# Patient Record
Sex: MALE | Race: WHITE | HISPANIC OR LATINO | Employment: UNEMPLOYED | URBAN - METROPOLITAN AREA
[De-identification: names, ages, dates, MRNs, and addresses within clinical notes are randomized per-mention and may not be internally consistent; named-entity substitution may affect disease eponyms.]

---

## 2017-09-04 ENCOUNTER — HOSPITAL ENCOUNTER (EMERGENCY)
Facility: HOSPITAL | Age: 47
Discharge: HOME | End: 2017-09-04
Attending: EMERGENCY MEDICINE

## 2017-09-04 VITALS
WEIGHT: 109 LBS | SYSTOLIC BLOOD PRESSURE: 122 MMHG | BODY MASS INDEX: 18.61 KG/M2 | RESPIRATION RATE: 20 BRPM | TEMPERATURE: 97 F | DIASTOLIC BLOOD PRESSURE: 86 MMHG | OXYGEN SATURATION: 99 % | HEIGHT: 64 IN | HEART RATE: 67 BPM

## 2017-09-04 DIAGNOSIS — R07.9 CHEST PAIN: ICD-10-CM

## 2017-09-04 DIAGNOSIS — R07.89 ANTERIOR CHEST WALL PAIN: ICD-10-CM

## 2017-09-04 DIAGNOSIS — S46.812A TRAPEZIUS STRAIN, LEFT, INITIAL ENCOUNTER: Primary | ICD-10-CM

## 2017-09-04 LAB
ALBUMIN SERPL BCP-MCNC: 4.1 G/DL
ALP SERPL-CCNC: 72 U/L
ALT SERPL W/O P-5'-P-CCNC: 17 U/L
ANION GAP SERPL CALC-SCNC: 9 MMOL/L
AST SERPL-CCNC: 18 U/L
BASOPHILS # BLD AUTO: 0.03 K/UL
BASOPHILS NFR BLD: 0.5 %
BILIRUB SERPL-MCNC: 0.7 MG/DL
BNP SERPL-MCNC: 26 PG/ML
BUN SERPL-MCNC: 12 MG/DL
CALCIUM SERPL-MCNC: 9.8 MG/DL
CHLORIDE SERPL-SCNC: 104 MMOL/L
CO2 SERPL-SCNC: 29 MMOL/L
CREAT SERPL-MCNC: 1.1 MG/DL
DIFFERENTIAL METHOD: ABNORMAL
EOSINOPHIL # BLD AUTO: 0.1 K/UL
EOSINOPHIL NFR BLD: 2.1 %
ERYTHROCYTE [DISTWIDTH] IN BLOOD BY AUTOMATED COUNT: 12.6 %
EST. GFR  (AFRICAN AMERICAN): >60 ML/MIN/1.73 M^2
EST. GFR  (NON AFRICAN AMERICAN): >60 ML/MIN/1.73 M^2
GLUCOSE SERPL-MCNC: 123 MG/DL
HCT VFR BLD AUTO: 47.8 %
HGB BLD-MCNC: 16 G/DL
LYMPHOCYTES # BLD AUTO: 0.9 K/UL
LYMPHOCYTES NFR BLD: 15.3 %
MCH RBC QN AUTO: 30.4 PG
MCHC RBC AUTO-ENTMCNC: 33.5 G/DL
MCV RBC AUTO: 91 FL
MONOCYTES # BLD AUTO: 0.4 K/UL
MONOCYTES NFR BLD: 7.6 %
NEUTROPHILS # BLD AUTO: 4.3 K/UL
NEUTROPHILS NFR BLD: 74.5 %
PLATELET # BLD AUTO: 246 K/UL
PMV BLD AUTO: 10.5 FL
POTASSIUM SERPL-SCNC: 4.4 MMOL/L
PROT SERPL-MCNC: 7.3 G/DL
RBC # BLD AUTO: 5.26 M/UL
SODIUM SERPL-SCNC: 142 MMOL/L
TROPONIN I SERPL DL<=0.01 NG/ML-MCNC: 0.01 NG/ML
WBC # BLD AUTO: 5.82 K/UL

## 2017-09-04 PROCEDURE — 80053 COMPREHEN METABOLIC PANEL: CPT

## 2017-09-04 PROCEDURE — 63600175 PHARM REV CODE 636 W HCPCS: Performed by: PHYSICIAN ASSISTANT

## 2017-09-04 PROCEDURE — 99284 EMERGENCY DEPT VISIT MOD MDM: CPT | Mod: 25

## 2017-09-04 PROCEDURE — 93005 ELECTROCARDIOGRAM TRACING: CPT

## 2017-09-04 PROCEDURE — 84484 ASSAY OF TROPONIN QUANT: CPT

## 2017-09-04 PROCEDURE — 83880 ASSAY OF NATRIURETIC PEPTIDE: CPT

## 2017-09-04 PROCEDURE — 93010 ELECTROCARDIOGRAM REPORT: CPT | Mod: ,,, | Performed by: INTERNAL MEDICINE

## 2017-09-04 PROCEDURE — 85025 COMPLETE CBC W/AUTO DIFF WBC: CPT

## 2017-09-04 PROCEDURE — 96372 THER/PROPH/DIAG INJ SC/IM: CPT

## 2017-09-04 RX ORDER — CYCLOBENZAPRINE HCL 10 MG
10 TABLET ORAL 3 TIMES DAILY PRN
Qty: 30 TABLET | Refills: 0 | Status: SHIPPED | OUTPATIENT
Start: 2017-09-04 | End: 2017-09-09

## 2017-09-04 RX ORDER — IBUPROFEN 200 MG
200 TABLET ORAL EVERY 6 HOURS PRN
COMMUNITY

## 2017-09-04 RX ORDER — KETOROLAC TROMETHAMINE 30 MG/ML
10 INJECTION, SOLUTION INTRAMUSCULAR; INTRAVENOUS
Status: COMPLETED | OUTPATIENT
Start: 2017-09-04 | End: 2017-09-04

## 2017-09-04 RX ADMIN — KETOROLAC TROMETHAMINE 10 MG: 30 INJECTION, SOLUTION INTRAMUSCULAR at 11:09

## 2017-09-04 NOTE — DISCHARGE INSTRUCTIONS
Establish care with primary care physician.  Take medications as prescribed.  Return to this ED if any problems occur.

## 2017-09-04 NOTE — ED PROVIDER NOTES
"Encounter Date: 9/4/2017    SCRIBE #1 NOTE: I, Karlie George, am scribing for, and in the presence of,  Justin Fields PA-C. I have scribed the following portions of the note - Other sections scribed: HPI/ROS.       History     Chief Complaint   Patient presents with    Headache     "My blood pressure is high and I feel tired" x couple of days. Also c/o lt upper back and shoulder pain with movement. Sts hurt shoulder years ago.    Hypertension     Also c/o lt chest hurting at times.     CC: Myalgia    HPI: Carissa 46 y.o. Male with no pertinent medical history presents to the ED c/o acute, mild myalgia (pain to the left trapezius muscle). Patient notes pain becomes worse with palpation. 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 Patient is also c/o acute numbness to digits of left hand, mild left-sided chest pain, and intermittent frontal headache. He attempted tx with Motrin for headache. Patient reports elevated blood pressure recently. He notes taking blood pressure which read "147/100" while at his sister's house. No exacerbating factors reported. No tx for other symptoms listed. Patient denies fever, chills, back pain, SOB, difficulty urinating, abdominal pain, N/V/D, hx of HTN, and recently taking any blood pressure medications. He does not have a PCP.       The history is provided by the patient. No  was used.     Review of patient's allergies indicates:  No Known Allergies  History reviewed. No pertinent past medical history.  Past Surgical History:   Procedure Laterality Date    APPENDECTOMY       History reviewed. No pertinent family history.  Social History   Substance Use Topics    Smoking status: Never Smoker    Smokeless tobacco: Never Used    Alcohol use Yes     Review of Systems   Constitutional: Negative for chills and fever.   HENT: Negative for congestion, ear pain, rhinorrhea and sore throat.    Eyes: Negative for pain and " visual disturbance.   Respiratory: Negative for cough and shortness of breath.    Cardiovascular: Positive for chest pain.   Gastrointestinal: Negative for abdominal pain, diarrhea, nausea and vomiting.   Genitourinary: Negative for dysuria.   Musculoskeletal: Positive for myalgias. Negative for back pain and neck pain.   Skin: Negative for rash.   Neurological: Positive for numbness and headaches.       Physical Exam     Initial Vitals [09/04/17 1058]   BP Pulse Resp Temp SpO2   (!) 142/86 97 20 98 °F (36.7 °C) 97 %      MAP       104.67         Physical Exam    Nursing note and vitals reviewed.  Constitutional: He appears well-developed and well-nourished. He is not diaphoretic. No distress.   HENT:   Head: Normocephalic and atraumatic.   Eyes: Conjunctivae and EOM are normal. Pupils are equal, round, and reactive to light.   Neck: Normal range of motion. Neck supple.       Cardiovascular: Normal heart sounds and intact distal pulses.   No murmur heard.  Pulmonary/Chest: Breath sounds normal. No respiratory distress. He has no wheezes. He has no rhonchi. He exhibits no tenderness.       Abdominal: Soft. Bowel sounds are normal. He exhibits no distension and no mass. There is no tenderness. There is no rebound and no guarding.   Musculoskeletal: Normal range of motion.   Neurological: He is alert and oriented to person, place, and time. He has normal strength.   Skin: Skin is warm and dry. Capillary refill takes less than 2 seconds. No rash and no abscess noted. No erythema.   Psychiatric: He has a normal mood and affect. His behavior is normal. Judgment and thought content normal.         ED Course   Procedures  Labs Reviewed   CBC W/ AUTO DIFFERENTIAL - Abnormal; Notable for the following:        Result Value    Lymph # 0.9 (*)     Gran% 74.5 (*)     Lymph% 15.3 (*)     All other components within normal limits   COMPREHENSIVE METABOLIC PANEL - Abnormal; Notable for the following:     Glucose 123 (*)     All  other components within normal limits   TROPONIN I   B-TYPE NATRIURETIC PEPTIDE             Medical Decision Making:   Initial Assessment:   46-year-old male chief complaint high blood pressure over the past 2 days in addition to left shoulder pain and left chest pain.  He admits to intermittent headache which has spontaneously resolved.  Differential Diagnosis:   Migraine, headache, dehydration, tension headache, headache disorder unspecified, MI, ischemia, muscle strain, neck strain, muscle spasm  ED Management:  Patient overall well-appearing, in no acute distress, afebrile, vitals within normal limits.    Patient admits to somewhat strenuous activities at work intermittently.  He states his left trapezius and left anterior chest have been bothering him for some time, however worsened over the past few days.  He states he has been checking his pressure with his sister blood pressure monitor, and states it is been elevated, somewhere near 140/100. He denies lightheadedness/dizziness, n/v, or visual disturbance, however does admit to intermittent headache.  He states headache has resolved at this time.  He denies history of cerebral aneurysms.  He denies history of TIA/CVA.  He admits to left arm pain without left arm weakness.  He is neurologically intact.  He denies slurred speech or amnesia.  Face is symmetric, tongue is midline.  Shoulder shrug against resistance is normal.  I do not suspect acute intracranial process at this time.  He denies trauma.  I do not feel need for imaging at this time.  On exam, patient does exhibit mild tenderness to palpation of left trapezius in addition to left anterior chest.  He states this pain does come and go.  He denies chest pain or shortness of breath.  There was some confusion during the exam as to whether chest pain was reproducible, I suspect secondary to leg which barrier.  I did order cardiac enzymes to rule out cardiogenic process.  EKG in sinus rhythm with short [place]  interval.  Ventricular rate 70 bpm without evidence of ischemia, arrhythmia, or heart block.  Chest x-ray without evidence of consolidation, effusion, or pneumothorax.  BNP negative.  Troponin negative.  CBC without evidence of infection or anemia.  CMP without acute abnormality.  Potassium and sodium within normal limits.  Patient does feel better after Toradol.  I will discharge with short course of muscle relaxers as a do suspect this presentation is most consistent with muscular skeletal type pain.  I do feel he is safe and stable for discharge without further intervention.  I've asked him to establish primary care physician to help manage blood pressure.  His blood pressure is not significantly elevated this time, therefore do not feel need to treat at this time.  He states he was previously on blood pressure medication, however was taken off by a former primary care physician secondary to the way that it made him feel.  I strongly advised him to establish care again.  He does understand.  I've asked him to return to this ED if any other problems occur.  Other:   I have discussed this case with another health care provider.       <> Summary of the Discussion: I have discussed this case with .            Scribe Attestation:   Scribe #1: I performed the above scribed service and the documentation accurately describes the services I performed. I attest to the accuracy of the note.    Attending Attestation:           Physician Attestation for Scribe:  Physician Attestation Statement for Scribe #1: I, Justin Fields PA-C, reviewed documentation, as scribed by Karlie George in my presence, and it is both accurate and complete.                 ED Course      Clinical Impression:   The primary encounter diagnosis was Trapezius strain, left, initial encounter. Diagnoses of Chest pain and Anterior chest wall pain were also pertinent to this visit.    Disposition:   Disposition: Discharged  Condition:  Stable                        Justin Fields PA-C  09/04/17 8895

## 2019-07-11 ENCOUNTER — HOSPITAL ENCOUNTER (EMERGENCY)
Facility: HOSPITAL | Age: 49
Discharge: HOME | End: 2019-07-11
Attending: EMERGENCY MEDICINE

## 2019-07-11 VITALS
RESPIRATION RATE: 18 BRPM | SYSTOLIC BLOOD PRESSURE: 130 MMHG | DIASTOLIC BLOOD PRESSURE: 87 MMHG | TEMPERATURE: 98 F | HEART RATE: 67 BPM | WEIGHT: 110 LBS | OXYGEN SATURATION: 95 % | HEIGHT: 64 IN | BODY MASS INDEX: 18.78 KG/M2

## 2019-07-11 DIAGNOSIS — R39.9 UTI SYMPTOMS: Primary | ICD-10-CM

## 2019-07-11 LAB
BACTERIA #/AREA URNS HPF: NORMAL /HPF
BILIRUB UR QL STRIP: NEGATIVE
CLARITY UR: CLEAR
COLOR UR: YELLOW
GLUCOSE UR QL STRIP: NEGATIVE
HGB UR QL STRIP: NEGATIVE
KETONES UR QL STRIP: NEGATIVE
LEUKOCYTE ESTERASE UR QL STRIP: ABNORMAL
MICROSCOPIC COMMENT: NORMAL
NITRITE UR QL STRIP: NEGATIVE
PH UR STRIP: 7 [PH] (ref 5–8)
PROT UR QL STRIP: NEGATIVE
SP GR UR STRIP: 1.01 (ref 1–1.03)
SQUAMOUS #/AREA URNS HPF: 1 /HPF
URN SPEC COLLECT METH UR: ABNORMAL
UROBILINOGEN UR STRIP-ACNC: NEGATIVE EU/DL
WBC #/AREA URNS HPF: 1 /HPF (ref 0–5)

## 2019-07-11 PROCEDURE — 81000 URINALYSIS NONAUTO W/SCOPE: CPT

## 2019-07-11 PROCEDURE — 63600175 PHARM REV CODE 636 W HCPCS: Performed by: PHYSICIAN ASSISTANT

## 2019-07-11 PROCEDURE — 96372 THER/PROPH/DIAG INJ SC/IM: CPT

## 2019-07-11 PROCEDURE — 99284 EMERGENCY DEPT VISIT MOD MDM: CPT | Mod: 25

## 2019-07-11 PROCEDURE — 87491 CHLMYD TRACH DNA AMP PROBE: CPT

## 2019-07-11 PROCEDURE — 25000003 PHARM REV CODE 250: Performed by: PHYSICIAN ASSISTANT

## 2019-07-11 RX ORDER — AZITHROMYCIN 250 MG/1
1000 TABLET, FILM COATED ORAL
Status: COMPLETED | OUTPATIENT
Start: 2019-07-11 | End: 2019-07-11

## 2019-07-11 RX ORDER — CEFTRIAXONE 250 MG/1
250 INJECTION, POWDER, FOR SOLUTION INTRAMUSCULAR; INTRAVENOUS ONCE
Status: COMPLETED | OUTPATIENT
Start: 2019-07-11 | End: 2019-07-11

## 2019-07-11 RX ADMIN — CEFTRIAXONE SODIUM 250 MG: 250 INJECTION, POWDER, FOR SOLUTION INTRAMUSCULAR; INTRAVENOUS at 10:07

## 2019-07-11 RX ADMIN — AZITHROMYCIN 1000 MG: 250 TABLET, FILM COATED ORAL at 10:07

## 2019-07-11 NOTE — LETTER
07/14/2019    Victoriano Miller  [address] [place] [address]    Visit Date: 7/11/2019    Multiple attempts to contact you via the provided telephone have been unsuccessful. This written correspondence is to inform you that your test results from your recent visit have abnormal results. Please return to the Emergency Department immediately or call [phone] and ask to speak with a provider for further information.    Thank you,  [facility]  Emergency Department Staff          [facility] [address]  ?  [place] [address]  ?  phone [phone]  ?  fax [phone]  ?  [email or web address]

## 2019-07-11 NOTE — ED PROVIDER NOTES
Encounter Date: 7/11/2019    SCRIBE #1 NOTE: I, Belinda Milagros, am scribing for, and in the presence of,  Cortez Ellis PA-C. I have scribed the following portions of the note - Other sections scribed: HPI and ROS.       History     Chief Complaint   Patient presents with    medical clearance     pt reports that he was sent from work to assess for possible hernia, pt stated that he has no complaints/ pain, stated that he occasiionally has trouble with urniation     CC: Medical Clearance    HPI: This 48 y.o male, with no medical history, presents to the ED for a medical clearance. Pt reports that he was sent to the ED for concerns of a possible hernia after undergoing a physical yesterday at his job. He states that it has been taking him longer to empty his bladder over the last 2-3x days, adding that he has also been experiencing urinary frequency. He reports attempting treatment yesterday. Pt notes that he previously worked as a , which required him to engage in heavy lifting. Pt denies abdominal pain, dysuria, urine color change, testicular pain, back pain and fever. No other associated symptoms.     The history is provided by the patient. No  was used.     Review of patient's allergies indicates:  No Known Allergies  History reviewed. No pertinent past medical history.  Past Surgical History:   Procedure Laterality Date    APPENDECTOMY       History reviewed. No pertinent family history.  Social History     Tobacco Use    Smoking status: Never Smoker    Smokeless tobacco: Never Used   Substance Use Topics    Alcohol use: Yes     Comment: occ    Drug use: Never     Review of Systems   Constitutional: Negative for fever.   HENT: Negative for sore throat.    Respiratory: Negative for shortness of breath.    Cardiovascular: Negative for chest pain.   Gastrointestinal: Negative for abdominal pain, nausea and vomiting.   Genitourinary: Positive for frequency. Negative for dysuria  and testicular pain.        (+) incomplete voiding   Musculoskeletal: Negative for back pain.   Skin: Negative for rash.   Neurological: Negative for weakness.       Physical Exam     Initial Vitals [07/11/19 0935]   BP Pulse Resp Temp SpO2   125/85 77 18 98 °F (36.7 °C) 100 %      MAP       --         Physical Exam    Nursing note and vitals reviewed.  Constitutional: He appears well-developed and well-nourished. He is not diaphoretic. No distress.   HENT:   Head: Normocephalic and atraumatic.   Nose: Nose normal.   Eyes: Conjunctivae and EOM are normal. Pupils are equal, round, and reactive to light. Right eye exhibits no discharge. Left eye exhibits no discharge.   Neck: Normal range of motion. No tracheal deviation present. No JVD present.   Cardiovascular: Normal rate, regular rhythm and normal heart sounds. Exam reveals no friction rub.    No murmur heard.  Pulmonary/Chest: Breath sounds normal. No stridor. No respiratory distress. He has no wheezes. He has no rhonchi. He has no rales. He exhibits no tenderness.   Abdominal: Soft. He exhibits no distension. There is no tenderness. There is no rigidity, no rebound, no guarding, no CVA tenderness, no tenderness at McBurney's point and negative Crowell's sign. No hernia. Hernia confirmed negative in the right inguinal area and confirmed negative in the left inguinal area.   Musculoskeletal: Normal range of motion.   Neurological: He is alert and oriented to person, place, and time.   Skin: Skin is warm and dry. No rash and no abscess noted. No erythema. No pallor.         ED Course   Procedures  Labs Reviewed   URINALYSIS, REFLEX TO URINE CULTURE - Abnormal; Notable for the following components:       Result Value    Leukocytes, UA Trace (*)     All other components within normal limits    Narrative:     Preferred Collection Type->Urine, Clean Catch   C. TRACHOMATIS/N. GONORRHOEAE BY AMP DNA   URINALYSIS MICROSCOPIC    Narrative:     Preferred Collection  Type->Urine, Clean Catch          Imaging Results    None          Medical Decision Making:   History:   Old Medical Records: I decided to obtain old medical records.  Initial Assessment:   48-year-old male with urinary symptoms.  Also requesting clearance to go back to work after being told he may have a possible inguinal hernia.  Clinical Tests:   Lab Tests: Ordered and Reviewed  ED Management:  No obvious hernia on today's exam, including specifically for incarcerated and strangulated hernia.  No symptoms of obstruction.  I carefully considered but doubt acute appendicitis.  Small amount of bacteria urinalysis; will treat empirically with antibiotics.  GC culture pending.  I doubt ureteral stone.  No pyelonephritis.  Advising follow-up with PCP.  Strict return precautions discussed with patient.  Patient agreeable to plan.            Scribe Attestation:   Scribe #1: I performed the above scribed service and the documentation accurately describes the services I performed. I attest to the accuracy of the note.    Attending Attestation:           Physician Attestation for Scribe:  Physician Attestation Statement for Scribe #1: I, Cortez Ellis PA-C, reviewed documentation, as scribed by Belinda Linares in my presence, and it is both accurate and complete.                    Clinical Impression:       ICD-10-CM ICD-9-CM   1. UTI symptoms R39.9 788.99                                Cortez Ellis PA-C  07/11/19 1400

## 2019-07-11 NOTE — ED TRIAGE NOTES
Pt presents to ED via personal vehicle by self; pt states he applied for a job and they did a physical and they told him that he may possibly have an inguinal hernia; pt is not c/o any pain at the moment; pt also says that he had some issues urinating this morning and it took longer than normal; in NAD; AAOx4

## 2019-07-12 LAB
C TRACH DNA SPEC QL NAA+PROBE: DETECTED
N GONORRHOEA DNA SPEC QL NAA+PROBE: NOT DETECTED

## 2019-08-10 ENCOUNTER — HOSPITAL ENCOUNTER (EMERGENCY)
Facility: HOSPITAL | Age: 49
Discharge: HOME | End: 2019-08-10
Attending: EMERGENCY MEDICINE

## 2019-08-10 VITALS
DIASTOLIC BLOOD PRESSURE: 90 MMHG | TEMPERATURE: 98 F | OXYGEN SATURATION: 98 % | WEIGHT: 112 LBS | SYSTOLIC BLOOD PRESSURE: 152 MMHG | HEART RATE: 76 BPM | HEIGHT: 64 IN | RESPIRATION RATE: 18 BRPM | BODY MASS INDEX: 19.12 KG/M2

## 2019-08-10 DIAGNOSIS — A74.9 CHLAMYDIA: Primary | ICD-10-CM

## 2019-08-10 PROCEDURE — 99282 EMERGENCY DEPT VISIT SF MDM: CPT

## 2019-08-10 NOTE — DISCHARGE INSTRUCTIONS
You have been previously treated for Chlamydia.  It is important to have your partner treated by her PCP or OBGYN for Chlamydia.  Please abstain from sex for the next until your partner is treated.

## 2019-08-10 NOTE — ED PROVIDER NOTES
Encounter Date: 8/10/2019       History     Chief Complaint   Patient presents with    Abnormal Lab     Patient reports that he received a letter in the mail to come back to the ED for abdnormal lab results from 7/11/19.     49 y/o male presents to the ED due to receiving a certified letter in the mail informing him to return.  He was seen in the ED on 7/11/2019 and had a positive Chlamydia result that was treated.  He was called to notify him of the result with no answer so a letter was sent.  Today he denies any symptoms.  He denies fever, rash, abdominal pain, nausea, vomiting, diarrhea, CP, SOB, numbness, weakness, tingling, dysuria, hematuria, testicle pain/swelling, or penile discharge.  He has no pain at present.          Review of patient's allergies indicates:  No Known Allergies  History reviewed. No pertinent past medical history.  Past Surgical History:   Procedure Laterality Date    APPENDECTOMY       History reviewed. No pertinent family history.  Social History     Tobacco Use    Smoking status: Never Smoker    Smokeless tobacco: Never Used   Substance Use Topics    Alcohol use: Yes     Comment: occ    Drug use: Never     Review of Systems   Constitutional: Negative for chills and fever.   HENT: Negative for congestion, ear pain, rhinorrhea and sore throat.    Eyes: Negative for pain, discharge and redness.   Respiratory: Negative for cough and shortness of breath.    Cardiovascular: Negative for chest pain.   Gastrointestinal: Negative for abdominal pain, diarrhea, nausea and vomiting.   Genitourinary: Negative for dysuria.   Musculoskeletal: Negative for back pain, neck pain and neck stiffness.   Skin: Negative for rash.   Neurological: Negative for dizziness, weakness, light-headedness, numbness and headaches.   Psychiatric/Behavioral: Negative for confusion.       Physical Exam     Initial Vitals [08/10/19 1154]   BP Pulse Resp Temp SpO2   130/86 67 16 97.8 °F (36.6 °C) 98 %      MAP        --         Physical Exam    Nursing note and vitals reviewed.  Constitutional: Vital signs are normal. He appears well-developed. He is cooperative.  Non-toxic appearance. He does not appear ill.   HENT:   Head: Normocephalic and atraumatic.   Eyes: Conjunctivae are normal.   Neck: Normal range of motion.   Cardiovascular: Normal rate and regular rhythm.   Pulmonary/Chest: Effort normal and breath sounds normal.   Abdominal: Normal appearance.   Neurological: He is alert and oriented to person, place, and time. GCS eye subscore is 4. GCS verbal subscore is 5. GCS motor subscore is 6.   Skin: Skin is warm, dry and intact. No rash noted.   Psychiatric: He has a normal mood and affect. His speech is normal and behavior is normal. Thought content normal.         ED Course   Procedures  Labs Reviewed - No data to display       Imaging Results    None                APC / Resident Notes:   This is an evaluation of a 48 y.o. male that presents to the Emergency Department for certified letter, lab results    Physical Exam shows a non-toxic, afebrile, and well appearing male. Normal physical exam, previous positive chlamydia with treatment on 7/11/2019    Vital signs are reassuring. If available, previous records reviewed.   RESULTS: previous positive chlamydia on 7/11/2019, discussed the importance of notifying partners for treatment and abstaining from sex     My overall impression is chlamydia on 7/11/2019 with treatment. I considered, but at this time, do not suspect UTI, GC, Trich.    ED Course: physical exam, chart review. D/C Meds: none. D/C Information: f/u. The diagnosis, treatment plan, instructions for follow-up and reevaluation with PCP as well as ED return precautions were discussed and understanding was verbalized. All questions or concerns have been addressed.                      Clinical Impression:       ICD-10-CM ICD-9-CM   1. Chlamydia A74.9 079.98       Disposition:   Disposition:  Discharged  Condition: Stable                        Torri Goins, P  08/10/19 4314

## 2019-08-10 NOTE — ED TRIAGE NOTES
Pt reports he received a letter in the mail related to his ED visit on 7/11/2019 that states he had an abnormal lab and needed to come back to the ED. Denies pain at this time

## 2022-03-15 ENCOUNTER — OFFICE VISIT (OUTPATIENT)
Dept: OPTOMETRY | Facility: CLINIC | Age: 52
End: 2022-03-15
Payer: MEDICAID

## 2022-03-15 DIAGNOSIS — H52.7 REFRACTIVE ERROR: ICD-10-CM

## 2022-03-15 DIAGNOSIS — H53.021 REFRACTIVE AMBLYOPIA OF RIGHT EYE: ICD-10-CM

## 2022-03-15 DIAGNOSIS — H26.9 CORTICAL CATARACT OF BOTH EYES: Primary | ICD-10-CM

## 2022-03-15 PROCEDURE — 1159F PR MEDICATION LIST DOCUMENTED IN MEDICAL RECORD: ICD-10-PCS | Mod: CPTII,,, | Performed by: OPTOMETRIST

## 2022-03-15 PROCEDURE — 1160F PR REVIEW ALL MEDS BY PRESCRIBER/CLIN PHARMACIST DOCUMENTED: ICD-10-PCS | Mod: CPTII,,, | Performed by: OPTOMETRIST

## 2022-03-15 PROCEDURE — 99999 PR PBB SHADOW E&M-EST. PATIENT-LVL II: ICD-10-PCS | Mod: PBBFAC,,, | Performed by: OPTOMETRIST

## 2022-03-15 PROCEDURE — 1160F RVW MEDS BY RX/DR IN RCRD: CPT | Mod: CPTII,,, | Performed by: OPTOMETRIST

## 2022-03-15 PROCEDURE — 99999 PR PBB SHADOW E&M-EST. PATIENT-LVL II: CPT | Mod: PBBFAC,,, | Performed by: OPTOMETRIST

## 2022-03-15 PROCEDURE — 99212 OFFICE O/P EST SF 10 MIN: CPT | Mod: PBBFAC,PO | Performed by: OPTOMETRIST

## 2022-03-15 PROCEDURE — 92004 PR EYE EXAM, NEW PATIENT,COMPREHESV: ICD-10-PCS | Mod: S$PBB,,, | Performed by: OPTOMETRIST

## 2022-03-15 PROCEDURE — 92004 COMPRE OPH EXAM NEW PT 1/>: CPT | Mod: S$PBB,,, | Performed by: OPTOMETRIST

## 2022-03-15 PROCEDURE — 1159F MED LIST DOCD IN RCRD: CPT | Mod: CPTII,,, | Performed by: OPTOMETRIST

## 2022-03-15 RX ORDER — MIRTAZAPINE 15 MG/1
15 TABLET, FILM COATED ORAL NIGHTLY
COMMUNITY
Start: 2022-01-27

## 2022-03-15 NOTE — PROGRESS NOTES
"Subjective:       Patient ID: Victoriano Miller is a 51 y.o. male      Chief Complaint   Patient presents with    Concerns About Ocular Health     History of Present Illness  Dls x 1 month [place]     52 y/o male presets today with c/o blurry vision od.   Pt wears bifocal glasses.     + od  tearing  + itching  No burning  No pain  + ha's  + floaters  No flashes    Eye meds  Clear eyes ou prn        Assessment/Plan:     1. Cortical cataract of both eyes  Pt inquiring about cataract surgery for blurry vision. NVS at this time. Monitor.     2. Refractive amblyopia of right eye  3. Refractive error  Pt was told by previous provider pt has "lazy eye". Advised CEIOL will not correct for lazy eye. Minimal change in distance Rx. Can consider NVO for near if needed. Educated patient on refractive error and discussed lens options. Dispensed updated spectacle Rx. Educated about adaptation period to new specs.      Eyeglass Final Rx     Eyeglass Final Rx       Sphere Cylinder Axis Add    Right -8.00 +4.25 095 +2.00    Left -5.00 +5.25 095 +2.00    Expiration Date: 3/15/2023                Follow up if symptoms worsen or fail to improve.         "